# Patient Record
Sex: MALE | Race: WHITE | NOT HISPANIC OR LATINO | URBAN - METROPOLITAN AREA
[De-identification: names, ages, dates, MRNs, and addresses within clinical notes are randomized per-mention and may not be internally consistent; named-entity substitution may affect disease eponyms.]

---

## 2019-07-12 ENCOUNTER — OFFICE VISIT CONVERTED (OUTPATIENT)
Dept: OTHER | Facility: HOSPITAL | Age: 39
End: 2019-07-12
Attending: NURSE PRACTITIONER

## 2019-07-12 ENCOUNTER — CONVERSION ENCOUNTER (OUTPATIENT)
Dept: OTHER | Facility: HOSPITAL | Age: 39
End: 2019-07-12

## 2019-07-12 ENCOUNTER — HOSPITAL ENCOUNTER (OUTPATIENT)
Dept: OTHER | Facility: HOSPITAL | Age: 39
Discharge: HOME | End: 2019-07-12
Attending: NURSE PRACTITIONER

## 2019-07-12 LAB
ALBUMIN SERPL-MCNC: 4.7 G/DL (ref 3.5–5)
ALBUMIN/GLOB SERPL: 1.7 {RATIO} (ref 1.4–2.6)
ALP SERPL-CCNC: 76 U/L (ref 53–128)
ALT SERPL-CCNC: 21 U/L (ref 10–40)
ANION GAP SERPL CALC-SCNC: 20 MMOL/L (ref 8–19)
AST SERPL-CCNC: 20 U/L (ref 15–50)
BASOPHILS # BLD AUTO: 0.04 10*3/UL (ref 0–0.2)
BASOPHILS NFR BLD AUTO: 0.6 % (ref 0–3)
BILIRUB SERPL-MCNC: 0.23 MG/DL (ref 0.2–1.3)
BUN SERPL-MCNC: 10 MG/DL (ref 5–25)
BUN/CREAT SERPL: 10 {RATIO} (ref 6–20)
CALCIUM SERPL-MCNC: 9 MG/DL (ref 8.7–10.4)
CHLORIDE SERPL-SCNC: 103 MMOL/L (ref 99–111)
CONV ABS IMM GRAN: 0.02 10*3/UL (ref 0–0.2)
CONV CO2: 22 MMOL/L (ref 22–32)
CONV IMMATURE GRAN: 0.3 % (ref 0–1.8)
CONV TOTAL PROTEIN: 7.5 G/DL (ref 6.3–8.2)
CREAT UR-MCNC: 1.02 MG/DL (ref 0.7–1.2)
DEPRECATED RDW RBC AUTO: 39.2 FL (ref 35.1–43.9)
EOSINOPHIL # BLD AUTO: 0.15 10*3/UL (ref 0–0.7)
EOSINOPHIL # BLD AUTO: 2.1 % (ref 0–7)
ERYTHROCYTE [DISTWIDTH] IN BLOOD BY AUTOMATED COUNT: 12.6 % (ref 11.6–14.4)
FOLATE SERPL-MCNC: 8.1 NG/ML (ref 4.8–20)
GFR SERPLBLD BASED ON 1.73 SQ M-ARVRAT: >60 ML/MIN/{1.73_M2}
GLOBULIN UR ELPH-MCNC: 2.8 G/DL (ref 2–3.5)
GLUCOSE SERPL-MCNC: 92 MG/DL (ref 70–99)
HBA1C MFR BLD: 16.4 G/DL (ref 14–18)
HCT VFR BLD AUTO: 48.2 % (ref 42–52)
LYMPHOCYTES # BLD AUTO: 2.11 10*3/UL (ref 1–5)
MCH RBC QN AUTO: 29.2 PG (ref 27–31)
MCHC RBC AUTO-ENTMCNC: 34 G/DL (ref 33–37)
MCV RBC AUTO: 85.9 FL (ref 80–96)
MONOCYTES # BLD AUTO: 0.53 10*3/UL (ref 0.2–1.2)
MONOCYTES NFR BLD AUTO: 7.3 % (ref 3–10)
NEUTROPHILS # BLD AUTO: 4.38 10*3/UL (ref 2–8)
NEUTROPHILS NFR BLD AUTO: 60.5 % (ref 30–85)
NRBC CBCN: 0 % (ref 0–0.7)
OSMOLALITY SERPL CALC.SUM OF ELEC: 291 MOSM/KG (ref 273–304)
PLATELET # BLD AUTO: 307 10*3/UL (ref 130–400)
PMV BLD AUTO: 11.4 FL (ref 9.4–12.4)
POTASSIUM SERPL-SCNC: 4.4 MMOL/L (ref 3.5–5.3)
RBC # BLD AUTO: 5.61 10*6/UL (ref 4.7–6.1)
SODIUM SERPL-SCNC: 141 MMOL/L (ref 135–147)
T4 FREE SERPL-MCNC: 1.1 NG/DL (ref 0.9–1.8)
TSH SERPL-ACNC: 2.66 M[IU]/L (ref 0.27–4.2)
VARIANT LYMPHS NFR BLD MANUAL: 29.2 % (ref 20–45)
VIT B12 SERPL-MCNC: 484 PG/ML (ref 211–911)
WBC # BLD AUTO: 7.23 10*3/UL (ref 4.8–10.8)

## 2019-07-13 LAB — T3FREE SERPL-MCNC: 3.9 PG/ML (ref 2–4.4)

## 2021-05-15 VITALS
HEIGHT: 70 IN | SYSTOLIC BLOOD PRESSURE: 142 MMHG | WEIGHT: 180.19 LBS | HEART RATE: 60 BPM | DIASTOLIC BLOOD PRESSURE: 86 MMHG | RESPIRATION RATE: 16 BRPM | OXYGEN SATURATION: 100 % | TEMPERATURE: 98.3 F | BODY MASS INDEX: 25.8 KG/M2

## 2022-09-12 ENCOUNTER — HOSPITAL ENCOUNTER (EMERGENCY)
Facility: HOSPITAL | Age: 42
Discharge: HOME | End: 2022-09-12

## 2022-09-12 PROCEDURE — 99211 OFF/OP EST MAY X REQ PHY/QHP: CPT

## 2023-05-04 ENCOUNTER — APPOINTMENT (OUTPATIENT)
Dept: GENERAL RADIOLOGY | Facility: HOSPITAL | Age: 43
End: 2023-05-04
Payer: MEDICAID

## 2023-05-04 ENCOUNTER — APPOINTMENT (OUTPATIENT)
Dept: CT IMAGING | Facility: HOSPITAL | Age: 43
End: 2023-05-04
Payer: MEDICAID

## 2023-05-04 ENCOUNTER — HOSPITAL ENCOUNTER (EMERGENCY)
Facility: HOSPITAL | Age: 43
Discharge: HOME | End: 2023-05-04
Attending: EMERGENCY MEDICINE
Payer: MEDICAID

## 2023-05-04 VITALS
DIASTOLIC BLOOD PRESSURE: 72 MMHG | RESPIRATION RATE: 16 BRPM | WEIGHT: 225 LBS | HEART RATE: 98 BPM | BODY MASS INDEX: 32.21 KG/M2 | SYSTOLIC BLOOD PRESSURE: 129 MMHG | OXYGEN SATURATION: 96 % | HEIGHT: 70 IN | TEMPERATURE: 97.5 F

## 2023-05-04 DIAGNOSIS — R55 NEAR SYNCOPE: Primary | ICD-10-CM

## 2023-05-04 LAB
ALBUMIN SERPL-MCNC: 4.1 G/DL (ref 3.5–5.2)
ALBUMIN/GLOB SERPL: 1.5 G/DL
ALP SERPL-CCNC: 87 U/L (ref 39–117)
ALT SERPL W P-5'-P-CCNC: 46 U/L (ref 1–41)
ANION GAP SERPL CALCULATED.3IONS-SCNC: 12.8 MMOL/L (ref 5–15)
AST SERPL-CCNC: 36 U/L (ref 1–40)
BASOPHILS # BLD AUTO: 0.04 10*3/MM3 (ref 0–0.2)
BASOPHILS NFR BLD AUTO: 0.5 % (ref 0–1.5)
BILIRUB SERPL-MCNC: 0.3 MG/DL (ref 0–1.2)
BILIRUB UR QL STRIP: NEGATIVE
BUN SERPL-MCNC: 11 MG/DL (ref 6–20)
BUN/CREAT SERPL: 10.4 (ref 7–25)
CALCIUM SPEC-SCNC: 9.2 MG/DL (ref 8.6–10.5)
CHLORIDE SERPL-SCNC: 102 MMOL/L (ref 98–107)
CLARITY UR: CLEAR
CO2 SERPL-SCNC: 21.2 MMOL/L (ref 22–29)
COLOR UR: YELLOW
CREAT SERPL-MCNC: 1.06 MG/DL (ref 0.76–1.27)
DEPRECATED RDW RBC AUTO: 47.6 FL (ref 37–54)
EGFRCR SERPLBLD CKD-EPI 2021: 89.9 ML/MIN/1.73
EOSINOPHIL # BLD AUTO: 0.27 10*3/MM3 (ref 0–0.4)
EOSINOPHIL NFR BLD AUTO: 3.7 % (ref 0.3–6.2)
ERYTHROCYTE [DISTWIDTH] IN BLOOD BY AUTOMATED COUNT: 14.6 % (ref 12.3–15.4)
GLOBULIN UR ELPH-MCNC: 2.7 GM/DL
GLUCOSE BLDC GLUCOMTR-MCNC: 145 MG/DL (ref 70–99)
GLUCOSE SERPL-MCNC: 198 MG/DL (ref 65–99)
GLUCOSE UR STRIP-MCNC: ABNORMAL MG/DL
HCT VFR BLD AUTO: 41.4 % (ref 37.5–51)
HGB BLD-MCNC: 14.4 G/DL (ref 13–17.7)
HGB UR QL STRIP.AUTO: NEGATIVE
HOLD SPECIMEN: NORMAL
HOLD SPECIMEN: NORMAL
IMM GRANULOCYTES # BLD AUTO: 0.03 10*3/MM3 (ref 0–0.05)
IMM GRANULOCYTES NFR BLD AUTO: 0.4 % (ref 0–0.5)
KETONES UR QL STRIP: NEGATIVE
LEUKOCYTE ESTERASE UR QL STRIP.AUTO: NEGATIVE
LYMPHOCYTES # BLD AUTO: 2.36 10*3/MM3 (ref 0.7–3.1)
LYMPHOCYTES NFR BLD AUTO: 32.3 % (ref 19.6–45.3)
MAGNESIUM SERPL-MCNC: 1.8 MG/DL (ref 1.6–2.6)
MCH RBC QN AUTO: 31.3 PG (ref 26.6–33)
MCHC RBC AUTO-ENTMCNC: 34.8 G/DL (ref 31.5–35.7)
MCV RBC AUTO: 90 FL (ref 79–97)
MONOCYTES # BLD AUTO: 0.66 10*3/MM3 (ref 0.1–0.9)
MONOCYTES NFR BLD AUTO: 9 % (ref 5–12)
NEUTROPHILS NFR BLD AUTO: 3.94 10*3/MM3 (ref 1.7–7)
NEUTROPHILS NFR BLD AUTO: 54.1 % (ref 42.7–76)
NITRITE UR QL STRIP: NEGATIVE
NRBC BLD AUTO-RTO: 0 /100 WBC (ref 0–0.2)
PH UR STRIP.AUTO: 6.5 [PH] (ref 5–8)
PLATELET # BLD AUTO: 265 10*3/MM3 (ref 140–450)
PMV BLD AUTO: 10.6 FL (ref 6–12)
POTASSIUM SERPL-SCNC: 3.6 MMOL/L (ref 3.5–5.2)
PROT SERPL-MCNC: 6.8 G/DL (ref 6–8.5)
PROT UR QL STRIP: NEGATIVE
QT INTERVAL: 362 MS
RBC # BLD AUTO: 4.6 10*6/MM3 (ref 4.14–5.8)
SODIUM SERPL-SCNC: 136 MMOL/L (ref 136–145)
SP GR UR STRIP: 1.01 (ref 1–1.03)
TROPONIN T SERPL HS-MCNC: 10 NG/L
UROBILINOGEN UR QL STRIP: ABNORMAL
WBC NRBC COR # BLD: 7.3 10*3/MM3 (ref 3.4–10.8)
WHOLE BLOOD HOLD COAG: NORMAL
WHOLE BLOOD HOLD SPECIMEN: NORMAL

## 2023-05-04 PROCEDURE — 99284 EMERGENCY DEPT VISIT MOD MDM: CPT

## 2023-05-04 PROCEDURE — 93010 ELECTROCARDIOGRAM REPORT: CPT | Performed by: INTERNAL MEDICINE

## 2023-05-04 PROCEDURE — 80053 COMPREHEN METABOLIC PANEL: CPT

## 2023-05-04 PROCEDURE — 84484 ASSAY OF TROPONIN QUANT: CPT

## 2023-05-04 PROCEDURE — 81003 URINALYSIS AUTO W/O SCOPE: CPT | Performed by: EMERGENCY MEDICINE

## 2023-05-04 PROCEDURE — 93005 ELECTROCARDIOGRAM TRACING: CPT

## 2023-05-04 PROCEDURE — 82948 REAGENT STRIP/BLOOD GLUCOSE: CPT

## 2023-05-04 PROCEDURE — 83735 ASSAY OF MAGNESIUM: CPT

## 2023-05-04 PROCEDURE — 36415 COLL VENOUS BLD VENIPUNCTURE: CPT

## 2023-05-04 PROCEDURE — 70450 CT HEAD/BRAIN W/O DYE: CPT

## 2023-05-04 PROCEDURE — 71045 X-RAY EXAM CHEST 1 VIEW: CPT

## 2023-05-04 PROCEDURE — 93005 ELECTROCARDIOGRAM TRACING: CPT | Performed by: EMERGENCY MEDICINE

## 2023-05-04 PROCEDURE — 85025 COMPLETE CBC W/AUTO DIFF WBC: CPT

## 2023-05-04 RX ORDER — SODIUM CHLORIDE 0.9 % (FLUSH) 0.9 %
10 SYRINGE (ML) INJECTION AS NEEDED
Status: DISCONTINUED | OUTPATIENT
Start: 2023-05-04 | End: 2023-05-04 | Stop reason: HOSPADM

## 2023-05-04 RX ADMIN — SODIUM CHLORIDE 1000 ML: 9 INJECTION, SOLUTION INTRAVENOUS at 18:34

## 2023-05-04 NOTE — ED PROVIDER NOTES
Time: 2:44 PM EDT  Date of encounter:  5/4/2023  Independent Historian/Clinical History and Information was obtained by:   Patient  Chief Complaint: presyncope    History is limited by: N/A    History of Present Illness:  Patient is a 42 y.o. year old male who presents to the emergency department for evaluation of presyncopal episode PTA. Wife states he is weak and slow to response verbally and physically. No LOC.  The patient has had autonomic dysfunction at times and is being worked up by his primary care doctor.  The patient reports cutting the grass earlier today and then they went for a walk.  He states that he felt overheated and near syncopal.  Patient denies prodromal symptoms including chest pain, shortness of breath, nausea, vomiting and diarrhea.  The patient has had longstanding right upper and right lower extremity weakness.  He reports that his right upper extremity feels shaky.    HPI    Patient Care Team  Primary Care Provider: Kerwin Stringer DO    Past Medical History:     Allergies   Allergen Reactions   • Demerol [Meperidine] Nausea Only and Irritability     History reviewed. No pertinent past medical history.  History reviewed. No pertinent surgical history.  History reviewed. No pertinent family history.    Home Medications:  Prior to Admission medications    Not on File        Social History:          Review of Systems:  Review of Systems   Constitutional: Negative for chills and fever.   HENT: Negative for congestion, rhinorrhea and sore throat.    Eyes: Negative for pain and visual disturbance.   Respiratory: Negative for apnea, cough, chest tightness and shortness of breath.    Cardiovascular: Negative for chest pain and palpitations.   Gastrointestinal: Negative for abdominal pain, diarrhea and vomiting.   Genitourinary: Negative for difficulty urinating and dysuria.   Musculoskeletal: Negative for joint swelling and myalgias.   Skin: Negative for color change.   Neurological: Positive  "for weakness. Negative for seizures, syncope, speech difficulty and headaches.   Psychiatric/Behavioral: Negative.    All other systems reviewed and are negative.       Physical Exam:  /72   Pulse 98   Temp 97.5 °F (36.4 °C) (Oral)   Resp 16   Ht 177.8 cm (70\")   Wt 102 kg (225 lb)   SpO2 96%   BMI 32.28 kg/m²     Physical Exam  Vitals and nursing note reviewed.   Constitutional:       General: He is not in acute distress.     Appearance: Normal appearance. He is not toxic-appearing.   HENT:      Head: Normocephalic and atraumatic.      Jaw: There is normal jaw occlusion.   Eyes:      General: Lids are normal.      Extraocular Movements: Extraocular movements intact.      Conjunctiva/sclera: Conjunctivae normal.      Pupils: Pupils are equal, round, and reactive to light.   Cardiovascular:      Rate and Rhythm: Normal rate and regular rhythm.      Pulses: Normal pulses.      Heart sounds: Normal heart sounds.   Pulmonary:      Effort: Pulmonary effort is normal. No respiratory distress.      Breath sounds: Normal breath sounds. No wheezing or rhonchi.   Abdominal:      General: Abdomen is flat. There is no distension.      Palpations: Abdomen is soft.      Tenderness: There is no abdominal tenderness. There is no guarding or rebound.   Musculoskeletal:         General: Normal range of motion.      Cervical back: Normal range of motion and neck supple.      Right lower leg: No edema.      Left lower leg: No edema.   Skin:     General: Skin is warm and dry.      Coloration: Skin is not cyanotic.   Neurological:      Mental Status: He is alert and oriented to person, place, and time. Mental status is at baseline.   Psychiatric:         Attention and Perception: Attention and perception normal.         Mood and Affect: Mood normal.                  Procedures:  Procedures      Medical Decision Making:      Comorbidities that affect care:    Gastroparesis    External Notes reviewed:    Previous Clinic Note: " Patient was seen by his PCP for headaches      The following orders were placed and all results were independently analyzed by me:  Orders Placed This Encounter   Procedures   • XR Chest 1 View   • CT Head Without Contrast   • [place] Draw   • Comprehensive Metabolic Panel   • Magnesium   • Single High Sensitivity Troponin T   • CBC Auto Differential   • Urinalysis With Microscopic If Indicated (No Culture) - Urine, Clean Catch   • NPO Diet NPO Type: Strict NPO   • Undress & Gown   • Cardiac Monitoring   • Continuous Pulse Oximetry   • Vital Signs   • Orthostatic Blood Pressure   • Oxygen Therapy- Nasal Cannula; 2 LPM; Titrate for SPO2: equal to or greater than, 92%   • POC Glucose Once   • POC Glucose Once   • ECG 12 Lead Chest Pain   • Insert Peripheral IV   • CBC & Differential   • Green Top (Gel)   • Lavender Top   • Gold Top - SST   • Light Blue Top       Medications Given in the Emergency Department:  Medications   sodium chloride 0.9 % flush 10 mL (has no administration in time range)   sodium chloride 0.9 % bolus 1,000 mL (0 mL Intravenous Stopped 5/4/23 1845)   sodium chloride 0.9 % bolus 1,000 mL (0 mL Intravenous Stopped 5/4/23 1845)        ED Course:    ED Course as of 05/04/23 2006   Thu May 04, 2023   1447 --- PROVIDER IN TRIAGE NOTE ---    The patient was evaluated by Beth foss in triage. Orders were placed and the patient is currently awaiting disposition.    [AJ]   2003 EKG:    Rhythm: sinus  Rate: 114  Axis: normal  Intervals: normal  ST Segment: no elevations    Interpreted by me   [BN]      ED Course User Index  [AJ] Beth Amaya PA-C  [BN] Raphael Spicer MD       Labs:    Lab Results (last 24 hours)     Procedure Component Value Units Date/Time    CBC & Differential [[id number]]  (Normal) Collected: 05/04/23 1401    Specimen: Blood Updated: 05/04/23 1420    Narrative:      The following orders were created for panel order CBC & Differential.  Procedure                                Abnormality         Status                     ---------                               -----------         ------                     CBC Auto Differential[[id number]]        Normal              Final result                 Please view results for these tests on the individual orders.    Comprehensive Metabolic Panel [[id number]]  (Abnormal) Collected: 05/04/23 1401    Specimen: Blood Updated: 05/04/23 1450     Glucose 198 mg/dL      BUN 11 mg/dL      Creatinine 1.06 mg/dL      Sodium 136 mmol/L      Potassium 3.6 mmol/L      Comment: Slight hemolysis detected by analyzer. Results may be affected.        Chloride 102 mmol/L      CO2 21.2 mmol/L      Calcium 9.2 mg/dL      Total Protein 6.8 g/dL      Albumin 4.1 g/dL      ALT (SGPT) 46 U/L      AST (SGOT) 36 U/L      Alkaline Phosphatase 87 U/L      Total Bilirubin 0.3 mg/dL      Globulin 2.7 gm/dL      A/G Ratio 1.5 g/dL      BUN/Creatinine Ratio 10.4     Anion Gap 12.8 mmol/L      eGFR 89.9 mL/min/1.73     Narrative:      GFR Normal >60  Chronic Kidney Disease <60  Kidney Failure <15      Magnesium [[id number]]  (Normal) Collected: 05/04/23 1401    Specimen: Blood Updated: 05/04/23 1450     Magnesium 1.8 mg/dL     Single High Sensitivity Troponin T [[id number]]  (Normal) Collected: 05/04/23 1401    Specimen: Blood Updated: 05/04/23 1450     HS Troponin T 10 ng/L     Narrative:      High Sensitive Troponin T Reference Range:  <10.0 ng/L- Negative Female for AMI  <15.0 ng/L- Negative Male for AMI  >=10 - Abnormal Female indicating possible myocardial injury.  >=15 - Abnormal Male indicating possible myocardial injury.   Clinicians would have to utilize clinical acumen, EKG, Troponin, and serial changes to determine if it is an Acute Myocardial Infarction or myocardial injury due to an underlying chronic condition.         CBC Auto Differential [[id number]]  (Normal) Collected: 05/04/23 1401    Specimen: Blood Updated: 05/04/23 1420     WBC 7.30 10*3/mm3      RBC  4.60 10*6/mm3      Hemoglobin 14.4 g/dL      Hematocrit 41.4 %      MCV 90.0 fL      MCH 31.3 pg      MCHC 34.8 g/dL      RDW 14.6 %      RDW-SD 47.6 fl      MPV 10.6 fL      Platelets 265 10*3/mm3      Neutrophil % 54.1 %      Lymphocyte % 32.3 %      Monocyte % 9.0 %      Eosinophil % 3.7 %      Basophil % 0.5 %      Immature Grans % 0.4 %      Neutrophils, Absolute 3.94 10*3/mm3      Lymphocytes, Absolute 2.36 10*3/mm3      Monocytes, Absolute 0.66 10*3/mm3      Eosinophils, Absolute 0.27 10*3/mm3      Basophils, Absolute 0.04 10*3/mm3      Immature Grans, Absolute 0.03 10*3/mm3      nRBC 0.0 /100 WBC     POC Glucose Once [[id number]]  (Abnormal) Collected: 05/04/23 1503    Specimen: Blood Updated: 05/04/23 1505     Glucose 145 mg/dL      Comment: Serial Number: [id number]:  [id number]       Urinalysis With Microscopic If Indicated (No Culture) - Urine, Clean Catch [[id number]]  (Abnormal) Collected: 05/04/23 1844    Specimen: Urine, Clean Catch Updated: 05/04/23 1850     Color, UA Yellow     Appearance, UA Clear     pH, UA 6.5     Specific Gravity, UA 1.013     Glucose,  mg/dL (1+)     Ketones, UA Negative     Bilirubin, UA Negative     Blood, UA Negative     Protein, UA Negative     Leuk Esterase, UA Negative     Nitrite, UA Negative     Urobilinogen, UA 0.2 E.U./dL    Narrative:      Urine microscopic not indicated.           Imaging:    CT Head Without Contrast    Result Date: 5/4/2023  PROCEDURE: CT HEAD WO CONTRAST  COMPARISON:  [facility], CT, HEAD, SINUS, 1/19/2009, 16:29.  [facility], CT, ENT PLANNING, 11/14/2011, 14:27. INDICATIONS: DIZZINESS AND PASSED OUT  X TODAY  PROTOCOL:   Standard imaging protocol performed    RADIATION:   954.2   MA and/or KV was adjusted to minimize radiation dose.     TECHNIQUE: Axial images of the head without intravenous contrast.  FINDINGS:  The ventricles have a normal size and configuration. There is no evidence of acute intracranial  hemorrhage, mass or midline shift. No extra-axial fluid collections are identified. There are no skull fractures.  There are postoperative changes in the paranasal sinuses.  IMPRESSION: Normal unenhanced CT scan of brain.  GRISELDA GERMAN MD       Electronically Signed and Approved By: GRISELDA GERMAN MD on 5/04/2023 at 19:45             XR Chest 1 View    Result Date: 5/4/2023  PROCEDURE: XR CHEST 1 VW  COMPARISON: None  INDICATIONS: CP, passed out, light headed, soa, ashtma  FINDINGS:  There is no pneumothorax, pleural effusion or focal airspace consolidation. The heart size and pulmonary vasculature appear within normal limits. There are no acute osseous abnormalities.       No acute cardiopulmonary abnormality.       TREVOR EVANS MD       Electronically Signed and Approved By: TREVOR EVANS MD on 5/04/2023 at 15:21                 Differential Diagnosis and Discussion:    Weakness: Based on the patient's history, signs, and symptoms, the diffential diagnosis includes but is not limited to meningitis, stroke, sepsis, subarachnoid hemorrhage, intracranial bleeding, encephalitis, acute uti, dehydration, MS, myasthenia gravis, Guillan Oley, migraine variant, neuromuscular disorders vertigo, electrolyte imbalance, and metabolic disorders.    All labs were reviewed and interpreted by me.  All X-rays impressions were independently interpreted by me.  EKG was interpreted by me.  CT scan radiology impression was interpreted by me.    MDM     The patient´s CBC that was reviewed and interpreted by me shows no abnormalities of critical concern. Of note, there is no anemia requiring a blood transfusion and the platelet count is acceptable.  The patient´s CMP that was reviewed and interpretted by me shows no abnormalities of critical concern. Of note, the patient´s sodium and potassium are acceptable. The patient´s liver enzymes are unremarkable. The patient´s renal function (creatinine) is preserved. The patient has a  normal anion gap.  CT scan of the head is negative for acute intracranial abnormalities.  Chest x-ray is negative for acute infiltrate.    Decision making regarding discharge:    In this 42-year-old male presenting with near syncope, it is crucial to consider a range of differential diagnoses to determine the underlying cause. Normal troponin, urinalysis, magnesium level, CMP, CBC, EKG, and CT scan of the head can help rule out several of these potential causes.    Differential diagnoses for near syncope include:    Cardiac causes: Arrhythmias, myocardial infarction, and valvular heart diseases can lead to near syncope. A normal troponin and EKG can help rule out myocardial infarction and certain arrhythmias, but not all cardiac causes.    Vasovagal: A common cause of near syncope that can be triggered by emotional stress, pain, or prolonged standing. Normal lab results and imaging do not necessarily rule out vasovagal syncope.    Orthostatic hypotension: A drop in blood pressure when standing up, which can be due to dehydration, blood loss, or certain medications.  For this, the patient was given a 1 L normal saline bolus.  He reports that he has had difficulty with orthostasis in the past.    Neurological causes: Seizures, transient ischemic attack (TIA), or intracranial masses can cause near syncope. A normal CT scan of the head can rule out intracranial masses, but not all neurological causes.    Metabolic causes: Hypoglycemia, electrolyte imbalances, or anemia can lead to near syncope. A normal CBC, CMP, and magnesium level can rule out anemia and significant electrolyte imbalances.    Psychogenic: Anxiety, panic attacks, or hyperventilation syndrome can cause near syncope. Normal lab results and imaging do not necessarily rule out psychogenic causes.    The patient is resting comfortably and feels better, is alert, talkative and in no distress. The repeat examination is unremarkable and benign. The patient is  neurologically intact, has a normal mental status and this ambulatory in the ED. The history, exam, diagnostic testing in the patient's current condition do not suggest meningitis, stroke, sepsis, subarachnoid hemorrhage, intracranial bleeding, encephalitis or other significant pathology that would warrant further testing, continued ED treatment, admission, neurological consultation, or other specialist evaluation at this point. The vital signs have been stable.  I have advised the patient to follow-up with his primary care doctor and a rheumatologist in the next 2 to 3 days.  The patient's condition is stable and appropriate for discharge. The patient will pursue further outpatient evaluation with the primary care physician or other designated or consulting position as indicated in the discharge instructions.        Patient Care Considerations:    PERC: I used the PERC score to risk stratify the patient for PE and a CT of the chest was considered but ultimately not indicated in today's visit.      Consultants/Shared Management Plan:    None    Social Determinants of Health:    Patient is independent, reliable, and has access to care.       Disposition and Care Coordination:    Discharged: I considered escalation of care by admitting this patient for observation, however the patient has improved and is suitable and  stable for discharge.    I have explained the patient´s condition, diagnoses and treatment plan based on the information available to me at this time. I have answered questions and addressed any concerns. The patient has a good  understanding of the patient´s diagnosis, condition, and treatment plan as can be expected at this point. The vital signs have been stable. The patient´s condition is stable and appropriate for discharge from the emergency department.      The patient will pursue further outpatient evaluation with the primary care physician or other designated or consulting physician as outlined  in the discharge instructions. They are agreeable to this plan of care and follow-up instructions have been explained in detail. The patient has received these instructions in written format and have expressed an understanding of the discharge instructions. The patient is aware that any significant change in condition or worsening of symptoms should prompt an immediate return to this or the closest emergency department or call to 911.  I have explained discharge medications and the need for follow up with the patient/caretakers. This was also printed in the discharge instructions. Patient was discharged with the following medications and follow up:      Medication List      No changes were made to your prescriptions during this visit.      Sharri Corbin MD  [address]  [facility] [address]  [phone]             Final diagnoses:   Near syncope        ED Disposition     ED Disposition   Discharge    Condition   Stable    Comment   --             This medical record created using voice recognition software.           Raphael Spicer MD  05/04/23 2007

## 2023-09-02 ENCOUNTER — HOSPITAL ENCOUNTER (EMERGENCY)
Facility: HOSPITAL | Age: 43
Discharge: HOME | End: 2023-09-02
Attending: EMERGENCY MEDICINE
Payer: MEDICAID

## 2023-09-02 VITALS
SYSTOLIC BLOOD PRESSURE: 111 MMHG | HEIGHT: 70 IN | RESPIRATION RATE: 20 BRPM | TEMPERATURE: 98 F | BODY MASS INDEX: 32.7 KG/M2 | WEIGHT: 228.4 LBS | OXYGEN SATURATION: 98 % | HEART RATE: 74 BPM | DIASTOLIC BLOOD PRESSURE: 52 MMHG

## 2023-09-02 DIAGNOSIS — R55 SYNCOPE AND COLLAPSE: Primary | ICD-10-CM

## 2023-09-02 LAB
ALBUMIN SERPL-MCNC: 4.7 G/DL (ref 3.5–5.2)
ALBUMIN/GLOB SERPL: 1.6 G/DL
ALP SERPL-CCNC: 89 U/L (ref 39–117)
ALT SERPL W P-5'-P-CCNC: 33 U/L (ref 1–41)
ANION GAP SERPL CALCULATED.3IONS-SCNC: 11.4 MMOL/L (ref 5–15)
AST SERPL-CCNC: 28 U/L (ref 1–40)
BASOPHILS # BLD AUTO: 0.06 10*3/MM3 (ref 0–0.2)
BASOPHILS NFR BLD AUTO: 0.7 % (ref 0–1.5)
BILIRUB SERPL-MCNC: 0.3 MG/DL (ref 0–1.2)
BUN SERPL-MCNC: 11 MG/DL (ref 6–20)
BUN/CREAT SERPL: 9.5 (ref 7–25)
CALCIUM SPEC-SCNC: 9.6 MG/DL (ref 8.6–10.5)
CHLORIDE SERPL-SCNC: 96 MMOL/L (ref 98–107)
CO2 SERPL-SCNC: 26.6 MMOL/L (ref 22–29)
CREAT SERPL-MCNC: 1.16 MG/DL (ref 0.76–1.27)
D DIMER PPP FEU-MCNC: 0.35 MCGFEU/ML (ref 0–0.5)
DEPRECATED RDW RBC AUTO: 46.7 FL (ref 37–54)
EGFRCR SERPLBLD CKD-EPI 2021: 80.1 ML/MIN/1.73
EOSINOPHIL # BLD AUTO: 0.38 10*3/MM3 (ref 0–0.4)
EOSINOPHIL NFR BLD AUTO: 4.7 % (ref 0.3–6.2)
ERYTHROCYTE [DISTWIDTH] IN BLOOD BY AUTOMATED COUNT: 13.9 % (ref 12.3–15.4)
GLOBULIN UR ELPH-MCNC: 2.9 GM/DL
GLUCOSE SERPL-MCNC: 101 MG/DL (ref 65–99)
HCT VFR BLD AUTO: 46.4 % (ref 37.5–51)
HGB BLD-MCNC: 16 G/DL (ref 13–17.7)
HOLD SPECIMEN: NORMAL
HOLD SPECIMEN: NORMAL
IMM GRANULOCYTES # BLD AUTO: 0.02 10*3/MM3 (ref 0–0.05)
IMM GRANULOCYTES NFR BLD AUTO: 0.2 % (ref 0–0.5)
LYMPHOCYTES # BLD AUTO: 2.15 10*3/MM3 (ref 0.7–3.1)
LYMPHOCYTES NFR BLD AUTO: 26.6 % (ref 19.6–45.3)
MAGNESIUM SERPL-MCNC: 2.2 MG/DL (ref 1.6–2.6)
MCH RBC QN AUTO: 31.4 PG (ref 26.6–33)
MCHC RBC AUTO-ENTMCNC: 34.5 G/DL (ref 31.5–35.7)
MCV RBC AUTO: 91.2 FL (ref 79–97)
MONOCYTES # BLD AUTO: 0.73 10*3/MM3 (ref 0.1–0.9)
MONOCYTES NFR BLD AUTO: 9 % (ref 5–12)
NEUTROPHILS NFR BLD AUTO: 4.75 10*3/MM3 (ref 1.7–7)
NEUTROPHILS NFR BLD AUTO: 58.8 % (ref 42.7–76)
NRBC BLD AUTO-RTO: 0 /100 WBC (ref 0–0.2)
PLATELET # BLD AUTO: 316 10*3/MM3 (ref 140–450)
PMV BLD AUTO: 10.5 FL (ref 6–12)
POTASSIUM SERPL-SCNC: 4.3 MMOL/L (ref 3.5–5.2)
PROT SERPL-MCNC: 7.6 G/DL (ref 6–8.5)
QT INTERVAL: 392 MS
QTC INTERVAL: 404 MS
RBC # BLD AUTO: 5.09 10*6/MM3 (ref 4.14–5.8)
SODIUM SERPL-SCNC: 134 MMOL/L (ref 136–145)
TROPONIN T SERPL HS-MCNC: 6 NG/L
WBC NRBC COR # BLD: 8.09 10*3/MM3 (ref 3.4–10.8)
WHOLE BLOOD HOLD COAG: NORMAL
WHOLE BLOOD HOLD SPECIMEN: NORMAL

## 2023-09-02 PROCEDURE — 96360 HYDRATION IV INFUSION INIT: CPT

## 2023-09-02 PROCEDURE — 84484 ASSAY OF TROPONIN QUANT: CPT

## 2023-09-02 PROCEDURE — 80053 COMPREHEN METABOLIC PANEL: CPT

## 2023-09-02 PROCEDURE — 99284 EMERGENCY DEPT VISIT MOD MDM: CPT

## 2023-09-02 PROCEDURE — 85025 COMPLETE CBC W/AUTO DIFF WBC: CPT

## 2023-09-02 PROCEDURE — 85379 FIBRIN DEGRADATION QUANT: CPT | Performed by: EMERGENCY MEDICINE

## 2023-09-02 PROCEDURE — 83735 ASSAY OF MAGNESIUM: CPT

## 2023-09-02 PROCEDURE — 93005 ELECTROCARDIOGRAM TRACING: CPT | Performed by: EMERGENCY MEDICINE

## 2023-09-02 RX ORDER — CYANOCOBALAMIN 1000 UG/ML
INJECTION, SOLUTION INTRAMUSCULAR; SUBCUTANEOUS
COMMUNITY
Start: 2023-07-18

## 2023-09-02 RX ORDER — BUSPIRONE HYDROCHLORIDE 5 MG/1
TABLET ORAL
COMMUNITY
Start: 2023-08-31

## 2023-09-02 RX ORDER — LISINOPRIL 40 MG/1
TABLET ORAL
COMMUNITY
Start: 2023-08-14

## 2023-09-02 RX ORDER — PROPRANOLOL HYDROCHLORIDE 80 MG/1
CAPSULE, EXTENDED RELEASE ORAL
COMMUNITY
Start: 2023-08-30

## 2023-09-02 RX ORDER — PROMETHAZINE HYDROCHLORIDE 25 MG/1
25 SUPPOSITORY RECTAL
COMMUNITY

## 2023-09-02 RX ORDER — FAMOTIDINE 20 MG/1
TABLET, FILM COATED ORAL
COMMUNITY
Start: 2023-06-30

## 2023-09-02 RX ORDER — ALBUTEROL SULFATE 90 UG/1
2 AEROSOL, METERED RESPIRATORY (INHALATION) EVERY 6 HOURS PRN
COMMUNITY
Start: 2023-05-18

## 2023-09-02 RX ORDER — DRONABINOL 2.5 MG/1
2.5 CAPSULE ORAL
COMMUNITY

## 2023-09-02 RX ORDER — BUDESONIDE AND FORMOTEROL FUMARATE DIHYDRATE 160; 4.5 UG/1; UG/1
2 AEROSOL RESPIRATORY (INHALATION) 2 TIMES DAILY
COMMUNITY
Start: 2023-05-18

## 2023-09-02 RX ORDER — DICYCLOMINE HCL 20 MG
40 TABLET ORAL
COMMUNITY
Start: 2023-03-17

## 2023-09-02 RX ORDER — ONDANSETRON 4 MG/1
4 TABLET, ORALLY DISINTEGRATING ORAL EVERY 8 HOURS PRN
Qty: 30 TABLET | Refills: 0 | Status: SHIPPED | OUTPATIENT
Start: 2023-09-02

## 2023-09-02 RX ORDER — DIPHENHYDRAMINE HCL 25 MG
50 CAPSULE ORAL
COMMUNITY

## 2023-09-02 RX ORDER — OMEPRAZOLE 20 MG/1
1 TABLET, DELAYED RELEASE ORAL DAILY
COMMUNITY
Start: 2023-05-18

## 2023-09-02 RX ORDER — LAMOTRIGINE 100 MG/1
100 TABLET ORAL
COMMUNITY
Start: 2023-07-26

## 2023-09-02 RX ORDER — DULOXETIN HYDROCHLORIDE 60 MG/1
60 CAPSULE, DELAYED RELEASE ORAL
COMMUNITY
Start: 2023-04-19

## 2023-09-02 RX ORDER — ONDANSETRON 4 MG/1
4 TABLET, FILM COATED ORAL EVERY 8 HOURS PRN
COMMUNITY
Start: 2023-05-18

## 2023-09-02 RX ORDER — TAMSULOSIN HYDROCHLORIDE 0.4 MG/1
0.8 CAPSULE ORAL DAILY
COMMUNITY
Start: 2023-03-20

## 2023-09-02 RX ORDER — LANOLIN ALCOHOL/MO/W.PET/CERES
CREAM (GRAM) TOPICAL
COMMUNITY

## 2023-09-02 RX ORDER — TRAZODONE HYDROCHLORIDE 150 MG/1
1 TABLET ORAL NIGHTLY
COMMUNITY
Start: 2023-04-28

## 2023-09-02 RX ORDER — SODIUM CHLORIDE 0.9 % (FLUSH) 0.9 %
10 SYRINGE (ML) INJECTION AS NEEDED
Status: DISCONTINUED | OUTPATIENT
Start: 2023-09-02 | End: 2023-09-02 | Stop reason: HOSPADM

## 2023-09-02 RX ADMIN — SODIUM CHLORIDE 2000 ML: 9 INJECTION, SOLUTION INTRAVENOUS at 14:28

## 2023-09-02 NOTE — ED NOTES
"Patient states he was diagnosed with autonomic neuropathy and is supposed to have twice weekly fluid infusions but has been unable to get them for the past four weeks.  He states yesterday, he had a syncopal episode, but \"my fists tightened up right before.\"  He denies hitting his head.  He believes he is dehydrated and needs fluids.   "

## 2023-09-02 NOTE — ED NOTES
"Patient and wife asked me to come talk with them.  Wife states he was complaining of calf pain that started yesterday, states it is bilateral but \"left is worse than the right.\"  Wife requesting d-dimer to rule out DVT.   "

## 2023-09-02 NOTE — ED PROVIDER NOTES
Time: 3:14 PM EDT  Date of encounter:  9/2/2023  Independent Historian/Clinical History and Information was obtained by:   Patient    History is limited by: N/A    Chief Complaint: Syncopal episode      History of Present Illness:  Patient is a 43 y.o. year old male who presents to the emergency department for evaluation of brief syncopal episode.  Patient has history of autonomic neuropathy with gastroparesis.  He easily gets dehydrated and had been getting twice weekly IV infusions of fluids but those were discontinued when his infusion center stopped offering saline infusions.  Patient has been able to arrange treatment with a new infusion center but feels he is dehydrated today.  Patient has had several previous similar syncopal episodes.  Patient denies any injury.    HPI    Patient Care Team  Primary Care Provider: Kerwin Stringer DO    Past Medical History:     Allergies   Allergen Reactions    Demerol [Meperidine] Nausea Only and Irritability     Past Medical History:   Diagnosis Date    Asthma     Autonomic neuropathy     Gastroparesis     Hypertension     Memory loss      Past Surgical History:   Procedure Laterality Date    COLON SURGERY      FACIAL RECONSTRUCTION SURGERY      HERNIA REPAIR      NERVE BIOPSY      PYLOROPLASTY      SHOULDER ARTHROSCOPY W/ LABRAL REPAIR       History reviewed. No pertinent family history.    Home Medications:  Prior to Admission medications    Medication Sig Start Date End Date Taking? Authorizing Provider   busPIRone (BUSPAR) 5 MG tablet  8/31/23  Yes ProviderLeo MD   cyanocobalamin 1000 MCG/ML injection  7/18/23  Yes Leo Renee MD   dicyclomine (BENTYL) 20 MG tablet Take 2 tablets by mouth. 3/17/23  Yes Leo Renee MD   DULoxetine (CYMBALTA) 60 MG capsule Take 1 capsule by mouth. 4/19/23  Yes Leo Renee MD EQ Omeprazole 20 MG tablet delayed-release Take 20 mg by mouth Daily. 5/18/23  Yes Leo Renee MD   famotidine  "(PEPCID) 20 MG tablet  6/30/23  Yes Leo Renee MD   lamoTRIgine (LaMICtal) 100 MG tablet Take 1 tablet by mouth. 7/26/23  Yes Leo Renee MD   lisinopril (PRINIVIL,ZESTRIL) 40 MG tablet  8/14/23  Yes Leo Renee MD   Lyrica 200 MG capsule Take 1 capsule by mouth 3 (Three) Times a Day. 5/2/23  Yes Leo Renee MD   ondansetron (ZOFRAN) 4 MG tablet Take 1 tablet by mouth Every 8 (Eight) Hours As Needed. for nausea 5/18/23  Yes Leo Renee MD   propranolol LA (INDERAL LA) 80 MG 24 hr capsule  8/30/23  Yes Leo Renee MD   Symbicort 160-4.5 MCG/ACT inhaler Inhale 2 puffs 2 (Two) Times a Day. 5/18/23  Yes Leo Renee MD   tamsulosin (FLOMAX) 0.4 MG capsule 24 hr capsule Take 2 capsules by mouth Daily. 3/20/23  Yes Leo Renee MD   traZODone (DESYREL) 150 MG tablet Take 1 tablet by mouth Every Night. 4/28/23  Yes Leo Renee MD   Ventolin  (90 Base) MCG/ACT inhaler Inhale 2 puffs Every 6 (Six) Hours As Needed for Wheezing. 5/18/23  Yes Leo Renee MD   diphenhydrAMINE (BENADRYL) 25 mg capsule Take 2 capsules by mouth.    Leo Renee MD   dronabinol (MARINOL) 2.5 MG capsule Take 1 capsule by mouth.    Leo Renee MD   Magnesium Oxide -Mg Supplement 400 (240 Mg) MG tablet Take  by mouth.    Leo Renee MD   promethazine (PHENERGAN) 25 MG suppository Insert 1 suppository into the rectum.    Leo Renee MD        Social History:   Social History     Tobacco Use    Smoking status: Every Day     Packs/day: 1.00     Types: Cigarettes   Substance Use Topics    Alcohol use: Never    Drug use: Yes     Types: Marijuana         Review of Systems:  Review of Systems   Neurological:  Positive for syncope.      Physical Exam:  /52 (BP Location: Left arm, Patient Position: Lying)   Pulse 74   Temp 98 °F (36.7 °C) (Oral)   Resp 20   Ht 177.8 cm (70\")   Wt 104 kg (228 lb 6.3 oz)   " SpO2 98%   BMI 32.77 kg/m²     Physical Exam  Vitals and nursing note reviewed.   Constitutional:       General: He is not in acute distress.     Appearance: Normal appearance.   HENT:      Head: Normocephalic and atraumatic.   Eyes:      Extraocular Movements: Extraocular movements intact.   Cardiovascular:      Rate and Rhythm: Normal rate and regular rhythm.   Pulmonary:      Effort: Pulmonary effort is normal. No respiratory distress.      Breath sounds: Normal breath sounds.   Abdominal:      Palpations: Abdomen is soft.      Tenderness: There is no abdominal tenderness.   Musculoskeletal:         General: Normal range of motion.      Cervical back: Normal range of motion.   Skin:     General: Skin is warm and dry.   Neurological:      Mental Status: He is alert and oriented to person, place, and time. Mental status is at baseline.   Psychiatric:         Mood and Affect: Mood normal.                Procedures:  Procedures      Medical Decision Making:      Comorbidities that affect care:    Gastroparesis, autonomic neuropathy    External Notes reviewed:    None      The following orders were placed and all results were independently analyzed by me:  Orders Placed This Encounter   Procedures    [place] Draw    Comprehensive Metabolic Panel    Magnesium    Single High Sensitivity Troponin T    CBC Auto Differential    D-dimer, Quantitative    Undress & Gown    Continuous Pulse Oximetry    Vital Signs    Orthostatic Blood Pressure    POC Glucose Once    ECG 12 Lead ED Triage Standing Order; Syncope    CBC & Differential    Green Top (Gel)    Lavender Top    Gold Top - SST    Light Blue Top       Medications Given in the Emergency Department:  Medications   sodium chloride 0.9 % bolus 2,000 mL (0 mL Intravenous Stopped 9/2/23 1528)        ED Course:         Labs:    Lab Results (last 24 hours)       ** No results found for the last 24 hours. **             Imaging:    No Radiology Exams Resulted Within Past 24  Hours      Differential Diagnosis and Discussion:    Syncope: Differential diagnosis includes but is not limited to TIA, hyperventilation, aortic stenosis, pulmonary emboli, myocardial disease, bradycardia arrhythmia, heart block, tachyarrhythmia, vasovagal, orthostatic hypotension, ruptured AAA, aortic dissection, subarachnoid hemorrhage, seizure, hypoglycemia.    All labs were reviewed and interpreted by me.    MDM     Patient received 2 L of IV fluids.  He states he feels completely fine.  Patient stable for discharge.      Patient Care Considerations:          Consultants/Shared Management Plan:    None    Social Determinants of Health:    Patient is independent, reliable, and has access to care.       Disposition and Care Coordination:    Discharged: The patient is suitable and stable for discharge with no need for consideration of observation or admission.    I have explained discharge medications and the need for follow up with the patient/caretakers. This was also printed in the discharge instructions. Patient was discharged with the following medications and follow up:      Medication List        New Prescriptions      ondansetron ODT 4 MG disintegrating tablet  Commonly known as: ZOFRAN-ODT  Place 1 tablet on the tongue Every 8 (Eight) Hours As Needed for Nausea or Vomiting.               Where to Get Your Medications        These medications were sent to [facility] [address] [place] [address] - [phone]  - [phone] [place] [id number]      Phone: [phone]   ondansetron ODT 4 MG disintegrating tablet      Kerwin Stringer DO  [address] [facility] [address] [phone]      As needed      Final diagnoses:   Syncope and collapse        ED Disposition       ED Disposition   Discharge    Condition   Stable    Comment   --               This medical record created using voice recognition software.             Jasbir Melvin,  DO  09/03/23 1343

## 2023-09-05 ENCOUNTER — TRANSCRIBE ORDERS (OUTPATIENT)
Dept: ADMINISTRATIVE | Facility: HOSPITAL | Age: 43
End: 2023-09-05
Payer: MEDICAID

## 2023-09-05 DIAGNOSIS — R11.2 NAUSEA AND VOMITING, UNSPECIFIED VOMITING TYPE: ICD-10-CM

## 2023-09-05 DIAGNOSIS — E86.0 DEHYDRATION: Primary | ICD-10-CM

## 2023-09-06 PROBLEM — R11.2 NAUSEA WITH VOMITING: Status: ACTIVE | Noted: 2023-09-06

## 2023-09-06 PROBLEM — E86.0 DEHYDRATION: Status: ACTIVE | Noted: 2023-09-06

## 2023-09-06 LAB
QT INTERVAL: 392 MS
QTC INTERVAL: 404 MS

## 2023-09-06 RX ORDER — ONDANSETRON 2 MG/ML
4 INJECTION INTRAMUSCULAR; INTRAVENOUS ONCE AS NEEDED
Start: 2023-09-13

## 2024-12-11 ENCOUNTER — TRANSCRIBE ORDERS (OUTPATIENT)
Dept: ADMINISTRATIVE | Facility: HOSPITAL | Age: 44
End: 2024-12-11
Payer: MEDICAID

## 2024-12-11 DIAGNOSIS — N20.0 RENAL STONE: ICD-10-CM

## 2024-12-11 DIAGNOSIS — R30.0 DYSURIA: Primary | ICD-10-CM

## 2024-12-19 ENCOUNTER — HOSPITAL ENCOUNTER (OUTPATIENT)
Dept: CT IMAGING | Facility: HOSPITAL | Age: 44
Discharge: HOME | End: 2024-12-19
Payer: MEDICAID

## 2024-12-19 DIAGNOSIS — N20.0 RENAL STONE: ICD-10-CM

## 2024-12-19 DIAGNOSIS — R30.0 DYSURIA: ICD-10-CM

## 2024-12-19 PROCEDURE — 74176 CT ABD & PELVIS W/O CONTRAST: CPT

## 2024-12-24 ENCOUNTER — TRANSCRIBE ORDERS (OUTPATIENT)
Dept: ADMINISTRATIVE | Facility: HOSPITAL | Age: 44
End: 2024-12-24
Payer: MEDICAID

## 2024-12-24 DIAGNOSIS — Z95.828 PORT-A-CATH IN PLACE: Primary | ICD-10-CM

## 2024-12-30 ENCOUNTER — HOSPITAL ENCOUNTER (OUTPATIENT)
Dept: INFUSION THERAPY | Facility: HOSPITAL | Age: 44
Discharge: HOME | End: 2024-12-30
Payer: MEDICAID

## 2024-12-30 VITALS
SYSTOLIC BLOOD PRESSURE: 121 MMHG | HEART RATE: 68 BPM | TEMPERATURE: 98 F | DIASTOLIC BLOOD PRESSURE: 63 MMHG | OXYGEN SATURATION: 97 % | RESPIRATION RATE: 18 BRPM

## 2024-12-30 DIAGNOSIS — Z95.828 PORT-A-CATH IN PLACE: Primary | ICD-10-CM

## 2024-12-30 PROCEDURE — 25010000002 HEPARIN LOCK FLUSH PER 10 UNITS

## 2024-12-30 PROCEDURE — 96523 IRRIG DRUG DELIVERY DEVICE: CPT

## 2024-12-30 RX ORDER — HEPARIN SODIUM (PORCINE) LOCK FLUSH IV SOLN 100 UNIT/ML 100 UNIT/ML
500 SOLUTION INTRAVENOUS AS NEEDED
Status: DISCONTINUED | OUTPATIENT
Start: 2024-12-30 | End: 2025-01-01 | Stop reason: HOSPADM

## 2024-12-30 RX ORDER — HEPARIN SODIUM (PORCINE) LOCK FLUSH IV SOLN 100 UNIT/ML 100 UNIT/ML
500 SOLUTION INTRAVENOUS AS NEEDED
OUTPATIENT
Start: 2025-01-27

## 2024-12-30 RX ADMIN — HEPARIN 500 UNITS: 100 SYRINGE at 17:13

## 2025-01-08 ENCOUNTER — OFFICE VISIT (OUTPATIENT)
Dept: UROLOGY | Age: 45
End: 2025-01-08
Payer: MEDICAID

## 2025-01-08 ENCOUNTER — HOSPITAL ENCOUNTER (OUTPATIENT)
Facility: HOSPITAL | Age: 45
Discharge: HOME | End: 2025-01-08
Admitting: UROLOGY
Payer: MEDICAID

## 2025-01-08 VITALS — WEIGHT: 228 LBS | HEIGHT: 70 IN | BODY MASS INDEX: 32.64 KG/M2

## 2025-01-08 DIAGNOSIS — N20.1 URETERAL STONE: Primary | ICD-10-CM

## 2025-01-08 DIAGNOSIS — N20.1 URETERAL STONE: ICD-10-CM

## 2025-01-08 PROCEDURE — 74018 RADEX ABDOMEN 1 VIEW: CPT

## 2025-01-08 RX ORDER — HUMAN IMMUNOGLOBULIN G 0.2 G/ML
LIQUID SUBCUTANEOUS
COMMUNITY
Start: 2024-12-12

## 2025-01-08 RX ORDER — GABAPENTIN 300 MG/1
CAPSULE ORAL
COMMUNITY
Start: 2024-11-19

## 2025-01-08 RX ORDER — TAMSULOSIN HYDROCHLORIDE 0.4 MG/1
1 CAPSULE ORAL DAILY
Qty: 30 CAPSULE | Refills: 0 | Status: SHIPPED | OUTPATIENT
Start: 2025-01-08 | End: 2025-02-07

## 2025-01-08 RX ORDER — HUMAN IMMUNOGLOBULIN G 0.2 G/ML
LIQUID SUBCUTANEOUS
COMMUNITY
Start: 2024-09-19

## 2025-01-08 RX ORDER — HUMAN IMMUNOGLOBULIN G 0.2 G/ML
LIQUID SUBCUTANEOUS
COMMUNITY
Start: 2024-11-14

## 2025-01-08 NOTE — PROGRESS NOTES
Noted Please call the patient regarding his KUB and let him know the stone is even farther down now just above the bladder.  He should call us in 1 to 2 weeks if he has not passed.

## 2025-01-08 NOTE — PROGRESS NOTES
"Chief Complaint  Renal stone    Subjective          Tenzin Gutierrez presents to [facility]OLOG[facility]    History of Present Illness  Patient was recently seen in the ER due to flank pain.  He has a distal 6 mm left ureteral stone.  He has a couple of other stones in his kidneys.  He states he is passed several stones previously but is also had lithotripsy and ureteroscopy in the past.  He is not currently having pain.  He has seen blood on and off.  He did pass a stone in August.      Objective   Vital Signs:   Ht 177.8 cm (70\")   Wt 103 kg (228 lb)   BMI 32.71 kg/m²       Physical Exam  Vitals and nursing note reviewed.   Constitutional:       Appearance: Normal appearance. He is well-developed.   Pulmonary:      Effort: Pulmonary effort is normal.      Breath sounds: Normal air entry.   Neurological:      Mental Status: He is alert and oriented to person, place, and time.      Motor: Motor function is intact.   Psychiatric:         Mood and Affect: Mood normal.         Behavior: Behavior normal.          Result Review :   The following data was reviewed by: Nevin Jenkins MD on 01/08/2025:    Results for orders placed or performed during the hospital encounter of 09/02/23   Comprehensive Metabolic Panel    Collection Time: 09/02/23 12:49 PM    Specimen: Blood   Result Value Ref Range    Glucose 101 (H) 65 - 99 mg/dL    BUN 11 6 - 20 mg/dL    Creatinine 1.16 0.76 - 1.27 mg/dL    Sodium 134 (L) 136 - 145 mmol/L    Potassium 4.3 3.5 - 5.2 mmol/L    Chloride 96 (L) 98 - 107 mmol/L    CO2 26.6 22.0 - 29.0 mmol/L    Calcium 9.6 8.6 - 10.5 mg/dL    Total Protein 7.6 6.0 - 8.5 g/dL    Albumin 4.7 3.5 - 5.2 g/dL    ALT (SGPT) 33 1 - 41 U/L    AST (SGOT) 28 1 - 40 U/L    Alkaline Phosphatase 89 39 - 117 U/L    Total Bilirubin 0.3 0.0 - 1.2 mg/dL    Globulin 2.9 gm/dL    A/G Ratio 1.6 g/dL    BUN/Creatinine Ratio 9.5 7.0 - 25.0    Anion Gap 11.4 5.0 - 15.0 mmol/L    eGFR 80.1 >60.0 mL/min/1.73   Magnesium "    Collection Time: 09/02/23 12:49 PM    Specimen: Blood   Result Value Ref Range    Magnesium 2.2 1.6 - 2.6 mg/dL   Single High Sensitivity Troponin T    Collection Time: 09/02/23 12:49 PM    Specimen: Blood   Result Value Ref Range    HS Troponin T 6 <15 ng/L   CBC Auto Differential    Collection Time: 09/02/23 12:49 PM    Specimen: Blood   Result Value Ref Range    WBC 8.09 3.40 - 10.80 10*3/mm3    RBC 5.09 4.14 - 5.80 10*6/mm3    Hemoglobin 16.0 13.0 - 17.7 g/dL    Hematocrit 46.4 37.5 - 51.0 %    MCV 91.2 79.0 - 97.0 fL    MCH 31.4 26.6 - 33.0 pg    MCHC 34.5 31.5 - 35.7 g/dL    RDW 13.9 12.3 - 15.4 %    RDW-SD 46.7 37.0 - 54.0 fl    MPV 10.5 6.0 - 12.0 fL    Platelets 316 140 - 450 10*3/mm3    Neutrophil % 58.8 42.7 - 76.0 %    Lymphocyte % 26.6 19.6 - 45.3 %    Monocyte % 9.0 5.0 - 12.0 %    Eosinophil % 4.7 0.3 - 6.2 %    Basophil % 0.7 0.0 - 1.5 %    Immature Grans % 0.2 0.0 - 0.5 %    Neutrophils, Absolute 4.75 1.70 - 7.00 10*3/mm3    Lymphocytes, Absolute 2.15 0.70 - 3.10 10*3/mm3    Monocytes, Absolute 0.73 0.10 - 0.90 10*3/mm3    Eosinophils, Absolute 0.38 0.00 - 0.40 10*3/mm3    Basophils, Absolute 0.06 0.00 - 0.20 10*3/mm3    Immature Grans, Absolute 0.02 0.00 - 0.05 10*3/mm3    nRBC 0.0 0.0 - 0.2 /100 WBC   D-dimer, Quantitative    Collection Time: 09/02/23 12:49 PM    Specimen: Blood   Result Value Ref Range    D-Dimer, Quantitative 0.35 0.00 - 0.50 MCGFEU/mL   Green Top (Gel)    Collection Time: 09/02/23 12:49 PM   Result Value Ref Range    Extra Tube Hold for add-ons.    Lavender Top    Collection Time: 09/02/23 12:49 PM   Result Value Ref Range    Extra Tube hold for add-on    Gold Top - SST    Collection Time: 09/02/23 12:49 PM   Result Value Ref Range    Extra Tube Hold for add-ons.    Light Blue Top    Collection Time: 09/02/23 12:49 PM   Result Value Ref Range    Extra Tube Hold for add-ons.    ECG 12 Lead ED Triage Standing Order; Syncope    Collection Time: 09/02/23  2:29 PM   Result Value  Ref Range    QT Interval 392 ms    QTC Interval 404 ms          Study Result    Narrative & Impression   CT ABDOMEN PELVIS STONE PROTOCOL     Date of Exam: 12/19/2024 3:03 PM EST     Indication: r30.0 and n20.0.     Comparison: None available.     Technique: Axial CT images were obtained of the abdomen and pelvis without the administration of contrast. Reconstructed coronal and sagittal images were also obtained. Automated exposure control and iterative construction methods were used.           FINDINGS:     Abdomen/Pelvis:     Lower Chest: There is some atelectasis at the lung bases. Lung bases otherwise grossly clear.     No free air is noted below the diaphragm.     Organs: Nonobstructing calculi are noted of the left kidney. There is a 5 mm calculus within the mid to inferior pole of the left kidney. Very mild dilation of the left ureter and proximal collecting system noted secondary to a 6.5 mm calculus within the   distal third of the left ureter within the pelvis. Punctate nonobstructing calculi are noted within the right kidney. No evidence of calculi along the visualized or expected course of the right ureter.     Limited noncontrast imaging of the pancreas, spleen and adrenal glands are unremarkable in appearance. Liver and the gallbladder appear unremarkable in appearance.     GI/Bowel: Stomach is distended with fluid and debris and appears grossly unremarkable on limited noncontrast imaging. The small bowel demonstrates no acute abnormality. There is no suspicious mesenteric adenopathy or fluid collection appreciated. There   is a heavy stool burden noted within the colon without acute inflammatory change. Relative diastases at the site of hernia repair noted of the mid abdomen. Mild irregularity is noted along portions of the mesh. Discrete hernia is not definitively   identified on limited imaging. There is small ill-defined 8 mm area within the anterior aspect of the omentum image #80 felt to  represent fat necrosis in the absence of known malignancy.     Pelvis: Bladder is decompressed with associated mild prominence of the wall thickness felt to be artifactual. Calcification is noted within the prostate. No suspicious pelvic fluid collection or adenopathy noted.     Peritoneum/Retroperitoneum: Aorta is normal in caliber. Incidental note of a circumaortic left renal vein. No suspicious adenopathy noted.     Bones/Soft Tissues: No acute osseous abnormality noted. Multilevel degenerative changes noted the spine greatest at L5-S1.     IMPRESSION:  1.Mild dilation of the left collecting system secondary to a 6.5 mm calculus within the distal third of the left ureter.  2.Bilateral nonobstructing renal calculi.  3.Other incidental findings as above.           Electronically Signed: Henrry Gillespie MD    12/20/2024 8:18 AM EST    Workstation ID: [id number]            Assessment and Plan    Diagnoses and all orders for this visit:    1. Ureteral stone (Primary)  -     XR Abdomen KUB; Future  -     tamsulosin (FLOMAX) 0.4 MG capsule 24 hr capsule; Take 1 capsule by mouth Daily for 30 days.  Dispense: 30 capsule; Refill: 0    Patient would like to continue to try to pass the stone.  Would get a KUB now to see if we still see it and if it is still there he will let us know if he is not passed it within the next week to 2.  If not he will need to be scheduled.  Otherwise we will continue to see him back in 1 year with a KUB prior.        Follow Up       No follow-ups on file.  Patient was given instructions and counseling regarding his condition or for health maintenance advice. Please see specific information pulled into the AVS if appropriate.

## 2025-02-10 ENCOUNTER — HOSPITAL ENCOUNTER (OUTPATIENT)
Dept: INFUSION THERAPY | Facility: HOSPITAL | Age: 45
Discharge: HOME | End: 2025-02-10
Payer: MEDICAID

## 2025-02-10 VITALS
DIASTOLIC BLOOD PRESSURE: 80 MMHG | HEART RATE: 72 BPM | TEMPERATURE: 97.3 F | RESPIRATION RATE: 20 BRPM | BODY MASS INDEX: 26.57 KG/M2 | SYSTOLIC BLOOD PRESSURE: 136 MMHG | HEIGHT: 70 IN | OXYGEN SATURATION: 95 % | WEIGHT: 185.63 LBS

## 2025-02-10 DIAGNOSIS — Z95.828 PORT-A-CATH IN PLACE: Primary | ICD-10-CM

## 2025-02-10 PROCEDURE — 96523 IRRIG DRUG DELIVERY DEVICE: CPT

## 2025-02-10 PROCEDURE — 25010000002 HEPARIN LOCK FLUSH PER 10 UNITS

## 2025-02-10 RX ORDER — HEPARIN SODIUM (PORCINE) LOCK FLUSH IV SOLN 100 UNIT/ML 100 UNIT/ML
500 SOLUTION INTRAVENOUS AS NEEDED
OUTPATIENT
Start: 2025-02-24

## 2025-02-10 RX ORDER — HEPARIN SODIUM (PORCINE) LOCK FLUSH IV SOLN 100 UNIT/ML 100 UNIT/ML
500 SOLUTION INTRAVENOUS AS NEEDED
Status: DISCONTINUED | OUTPATIENT
Start: 2025-02-10 | End: 2025-02-12 | Stop reason: HOSPADM

## 2025-02-10 RX ADMIN — HEPARIN 500 UNITS: 100 SYRINGE at 10:12

## 2025-02-21 ENCOUNTER — TELEPHONE (OUTPATIENT)
Dept: UROLOGY | Age: 45
End: 2025-02-21
Payer: MEDICAID

## 2025-02-21 DIAGNOSIS — N20.1 URETERAL STONE: Primary | ICD-10-CM

## 2025-02-21 RX ORDER — TAMSULOSIN HYDROCHLORIDE 0.4 MG/1
1 CAPSULE ORAL DAILY
Qty: 30 CAPSULE | Refills: 0 | Status: SHIPPED | OUTPATIENT
Start: 2025-02-21

## 2025-02-21 NOTE — TELEPHONE ENCOUNTER
Spoke with patient informing him that Dr. Jenkins is out of the office today and I spoke with Kaylie the nurse practitioner, who will send in another 30 days of Flomax. Also an order for a KUB has been placed into his chart.

## 2025-06-05 ENCOUNTER — TRANSCRIBE ORDERS (OUTPATIENT)
Dept: ADMINISTRATIVE | Facility: HOSPITAL | Age: 45
End: 2025-06-05
Payer: MEDICAID

## 2025-06-05 DIAGNOSIS — Z95.828 PORT-A-CATH IN PLACE: Primary | ICD-10-CM

## 2025-06-06 ENCOUNTER — HOSPITAL ENCOUNTER (OUTPATIENT)
Dept: CARDIOLOGY | Facility: HOSPITAL | Age: 45
Discharge: HOME | End: 2025-06-06
Payer: MEDICAID

## 2025-06-06 DIAGNOSIS — Z95.828 PORT-A-CATH IN PLACE: ICD-10-CM

## 2025-06-06 LAB
QT INTERVAL: 371 MS
QTC INTERVAL: 369 MS

## 2025-06-06 PROCEDURE — 93005 ELECTROCARDIOGRAM TRACING: CPT | Performed by: NURSE PRACTITIONER

## 2025-06-30 LAB
QT INTERVAL: 371 MS
QTC INTERVAL: 369 MS